# Patient Record
Sex: MALE | Race: WHITE | Employment: FULL TIME | URBAN - NONMETROPOLITAN AREA
[De-identification: names, ages, dates, MRNs, and addresses within clinical notes are randomized per-mention and may not be internally consistent; named-entity substitution may affect disease eponyms.]

---

## 2017-02-17 ENCOUNTER — PATIENT MESSAGE (OUTPATIENT)
Dept: FAMILY MEDICINE CLINIC | Facility: CLINIC | Age: 52
End: 2017-02-17

## 2017-02-17 RX ORDER — PROMETHAZINE HYDROCHLORIDE AND CODEINE PHOSPHATE 6.25; 1 MG/5ML; MG/5ML
5 SYRUP ORAL EVERY 4 HOURS PRN
Qty: 300 ML | Refills: 0 | Status: SHIPPED
Start: 2017-02-17 | End: 2017-07-12 | Stop reason: ALTCHOICE

## 2017-02-17 RX ORDER — PREDNISONE 20 MG/1
TABLET ORAL
Qty: 15 TABLET | Refills: 0 | Status: SHIPPED | OUTPATIENT
Start: 2017-02-17 | End: 2017-03-24

## 2017-02-17 NOTE — TELEPHONE ENCOUNTER
From: Venkat Scott  To: Beatriz Jackson MD  Sent: 2/17/2017 10:41 AM CST  Subject: Prescription Question    I went to urgent care back on 2/4/17 and was diagnosed with Influenza A.  It has been two weeks and the one thing that I have not been able to

## 2017-03-27 RX ORDER — PREDNISONE 20 MG/1
TABLET ORAL
Qty: 15 TABLET | Refills: 0 | Status: SHIPPED | OUTPATIENT
Start: 2017-03-27 | End: 2017-04-12

## 2017-03-27 RX ORDER — INDOMETHACIN 75 MG/1
CAPSULE, EXTENDED RELEASE ORAL
Qty: 60 CAPSULE | Refills: 0 | Status: SHIPPED | OUTPATIENT
Start: 2017-03-27 | End: 2017-07-17

## 2017-04-12 ENCOUNTER — PATIENT MESSAGE (OUTPATIENT)
Dept: FAMILY MEDICINE CLINIC | Facility: CLINIC | Age: 52
End: 2017-04-12

## 2017-04-12 RX ORDER — PREDNISONE 20 MG/1
TABLET ORAL
Qty: 15 TABLET | Refills: 3 | Status: SHIPPED | OUTPATIENT
Start: 2017-04-12 | End: 2017-07-11

## 2017-04-12 NOTE — TELEPHONE ENCOUNTER
From: Chayito Aggarwal  To: Augustine Estrada MD  Sent: 4/12/2017 10:25 AM CDT  Subject: Prescription Question    I was wondering if Dr. Brittany Pineda could put in another refill for prednisone for me?  I got 15 pills back on the 27th of March and since then I h

## 2017-07-11 RX ORDER — PREDNISONE 20 MG/1
TABLET ORAL
Qty: 15 TABLET | Refills: 3 | Status: SHIPPED
Start: 2017-07-11 | End: 2017-07-12 | Stop reason: ALTCHOICE

## 2017-07-11 RX ORDER — PREDNISONE 20 MG/1
TABLET ORAL
Qty: 15 TABLET | Refills: 0 | OUTPATIENT
Start: 2017-07-11

## 2017-07-11 NOTE — TELEPHONE ENCOUNTER
Patient called and has an appointment scheduled tomorrow with Dr. Kerrie Caldwell.      Last office visit; 9/21/16     Last refill: 4/12/17

## 2017-07-11 NOTE — TELEPHONE ENCOUNTER
From: Satya Garcia  Sent: 7/11/2017 9:04 AM CDT  Subject: Medication Renewal Request    Satya Garcia would like a refill of the following medications:  predniSONE 20 MG Oral Tab [Shun Grimm MD]    Preferred pharmacy: [address] [place] [address] [place]

## 2017-07-12 ENCOUNTER — OFFICE VISIT (OUTPATIENT)
Dept: FAMILY MEDICINE CLINIC | Facility: CLINIC | Age: 52
End: 2017-07-12

## 2017-07-12 VITALS
BODY MASS INDEX: 35.25 KG/M2 | WEIGHT: 238 LBS | SYSTOLIC BLOOD PRESSURE: 138 MMHG | HEART RATE: 95 BPM | DIASTOLIC BLOOD PRESSURE: 80 MMHG | TEMPERATURE: 97 F | OXYGEN SATURATION: 97 % | HEIGHT: 69 IN

## 2017-07-12 DIAGNOSIS — Z12.11 COLON CANCER SCREENING: ICD-10-CM

## 2017-07-12 DIAGNOSIS — M1A.09X0 CHRONIC GOUT OF MULTIPLE SITES, UNSPECIFIED CAUSE: Primary | ICD-10-CM

## 2017-07-12 DIAGNOSIS — Z23 NEED FOR DIPHTHERIA-TETANUS-PERTUSSIS (TDAP) VACCINE: ICD-10-CM

## 2017-07-12 PROCEDURE — 99213 OFFICE O/P EST LOW 20 MIN: CPT | Performed by: FAMILY MEDICINE

## 2017-07-12 PROCEDURE — 90715 TDAP VACCINE 7 YRS/> IM: CPT | Performed by: FAMILY MEDICINE

## 2017-07-12 PROCEDURE — 90471 IMMUNIZATION ADMIN: CPT | Performed by: FAMILY MEDICINE

## 2017-07-15 PROCEDURE — 82272 OCCULT BLD FECES 1-3 TESTS: CPT

## 2017-07-15 NOTE — PROGRESS NOTES
Dc Hough is a 50year old male.   Patient presents with:  Medication Follow-Up: . refills inrm 5      HPI:   Here for eval  Has had some gout issues  Related to diet as well    He states he has been better on the prednisone burst  About 4 per ye 10.3 mg/dL 9.1   GFR Latest Ref Range: >=60  80   ALKALINE PHOSPHATASE Latest Ref Range: 45 - 117 U/L 69   AST (SGOT) Latest Ref Range: 15 - 41 U/L 18   ALT (SGPT) Latest Ref Range: 17 - 63 U/L 48   Total Bilirubin Latest Ref Range: 0.1 - 2.0 mg/dL 0.5   T TOXOIDS AND ACELLULAR PERTUSIS VACCINE (TDAP), >7 YEARS, IM USE (Completed)            Return in about 1 year (around 7/12/2018), or if symptoms worsen or fail to improve, for blood pressure check, discussion of dosage adjustment, medication review.       Scooter Diaz

## 2017-07-15 NOTE — PATIENT INSTRUCTIONS
Gout Diet  Gout is a painful condition caused by an excess of uric acid, a waste product made by the body. Uric acid forms crystals that collect in the joints. The immune response to these crystals brings on symptoms of joint pain and swelling.  This is c · Dairy products that are low-fat or fat-free, such as cheese and yogurt  · Complex carbohydrate foods, including whole grains, brown rice, oats, and beans  · Coffee, in moderation  · Water, approximately 64 ounces per day  Follow-up care  Follow up with lucio

## 2017-07-17 RX ORDER — INDOMETHACIN 75 MG/1
CAPSULE, EXTENDED RELEASE ORAL
Qty: 60 CAPSULE | Refills: 0 | Status: SHIPPED | OUTPATIENT
Start: 2017-07-17 | End: 2017-10-06

## 2017-07-20 ENCOUNTER — APPOINTMENT (OUTPATIENT)
Dept: LAB | Facility: HOSPITAL | Age: 52
End: 2017-07-20
Attending: FAMILY MEDICINE
Payer: COMMERCIAL

## 2017-07-20 DIAGNOSIS — Z12.11 COLON CANCER SCREENING: ICD-10-CM

## 2017-07-31 LAB
AMB EXT CHOL/HDL RATIO: 3.9
AMB EXT CHOLESTEROL, TOTAL: 228 MG/DL
AMB EXT CREATININE: 1 MG/DL
AMB EXT GLUCOSE: 81 MG/DL
AMB EXT HDL CHOLESTEROL: 58 MG/DL
AMB EXT LDL CHOLESTEROL, DIRECT: 127 MG/DL
AMB EXT TRIGLYCERIDES: 214 MG/DL

## 2017-10-06 RX ORDER — PREDNISONE 20 MG/1
TABLET ORAL
Qty: 30 TABLET | Refills: 0 | Status: SHIPPED
Start: 2017-10-06 | End: 2017-11-17

## 2017-10-06 RX ORDER — INDOMETHACIN 75 MG/1
CAPSULE, EXTENDED RELEASE ORAL
Qty: 60 CAPSULE | Refills: 0 | Status: SHIPPED
Start: 2017-10-06 | End: 2017-12-29

## 2017-10-06 NOTE — TELEPHONE ENCOUNTER
From: Luzma Ordonez  Sent: 10/6/2017 1:53 PM CDT  Subject: Medication Renewal Request    Luzma Ordonez would like a refill of the following medications:     PREDNISONE 20 MG Oral Tab [Shun Grimm MD]     INDOMETHACIN ER 75 MG Oral Cap CR [

## 2017-10-07 RX ORDER — PREDNISONE 20 MG/1
TABLET ORAL
Qty: 15 TABLET | Refills: 0 | OUTPATIENT
Start: 2017-10-07

## 2017-10-07 RX ORDER — INDOMETHACIN 75 MG/1
CAPSULE, EXTENDED RELEASE ORAL
Qty: 60 CAPSULE | Refills: 0 | OUTPATIENT
Start: 2017-10-07

## 2017-11-17 RX ORDER — PREDNISONE 20 MG/1
TABLET ORAL
Qty: 30 TABLET | Refills: 0 | Status: SHIPPED | OUTPATIENT
Start: 2017-11-17 | End: 2017-12-29

## 2017-11-17 NOTE — TELEPHONE ENCOUNTER
Fax request received from [facility] requesting refill on Prednisone tapering dose. Last office visit 7-12-17 for gout.   Last refill 10-6-17

## 2017-12-29 RX ORDER — PREDNISONE 20 MG/1
TABLET ORAL
Qty: 30 TABLET | Refills: 6 | Status: SHIPPED | OUTPATIENT
Start: 2017-12-29 | End: 2018-11-05

## 2017-12-30 RX ORDER — INDOMETHACIN 75 MG/1
CAPSULE, EXTENDED RELEASE ORAL
Qty: 60 CAPSULE | Refills: 0 | Status: SHIPPED | OUTPATIENT
Start: 2017-12-30 | End: 2018-04-28

## 2018-04-28 RX ORDER — INDOMETHACIN 75 MG/1
CAPSULE, EXTENDED RELEASE ORAL
Qty: 180 CAPSULE | Refills: 0 | Status: SHIPPED | OUTPATIENT
Start: 2018-04-28 | End: 2018-10-29

## 2018-10-29 ENCOUNTER — TELEPHONE (OUTPATIENT)
Dept: FAMILY MEDICINE CLINIC | Facility: CLINIC | Age: 53
End: 2018-10-29

## 2018-10-29 RX ORDER — PREDNISONE 20 MG/1
TABLET ORAL
Qty: 30 TABLET | Refills: 0 | OUTPATIENT
Start: 2018-10-29

## 2018-10-29 RX ORDER — INDOMETHACIN 75 MG/1
CAPSULE, EXTENDED RELEASE ORAL
Qty: 28 CAPSULE | Refills: 0 | Status: SHIPPED | OUTPATIENT
Start: 2018-10-29 | End: 2019-02-19

## 2018-10-29 NOTE — TELEPHONE ENCOUNTER
Advised pt that refill was denied as he has not been seen sine 7/2017.  Pt is requesting a 2 week supply of indomethacin be sent to pharmacy and he will schedule appointment    Future Appointments   Date Time Provider Leah Javier   11/5/2018  2:00 PM

## 2018-11-05 ENCOUNTER — OFFICE VISIT (OUTPATIENT)
Dept: FAMILY MEDICINE CLINIC | Facility: CLINIC | Age: 53
End: 2018-11-05
Payer: COMMERCIAL

## 2018-11-05 VITALS
BODY MASS INDEX: 36.51 KG/M2 | WEIGHT: 246.5 LBS | TEMPERATURE: 98 F | HEART RATE: 86 BPM | SYSTOLIC BLOOD PRESSURE: 140 MMHG | HEIGHT: 69 IN | OXYGEN SATURATION: 99 % | DIASTOLIC BLOOD PRESSURE: 86 MMHG

## 2018-11-05 DIAGNOSIS — M1A.09X0 CHRONIC GOUT OF MULTIPLE SITES, UNSPECIFIED CAUSE: ICD-10-CM

## 2018-11-05 DIAGNOSIS — Z12.11 COLON CANCER SCREENING: ICD-10-CM

## 2018-11-05 DIAGNOSIS — Z99.89 OSA ON CPAP: ICD-10-CM

## 2018-11-05 DIAGNOSIS — M25.50 ARTHRALGIA, UNSPECIFIED JOINT: ICD-10-CM

## 2018-11-05 DIAGNOSIS — E66.9 OBESITY (BMI 35.0-39.9 WITHOUT COMORBIDITY): ICD-10-CM

## 2018-11-05 DIAGNOSIS — Z00.00 ROUTINE GENERAL MEDICAL EXAMINATION AT A HEALTH CARE FACILITY: Primary | ICD-10-CM

## 2018-11-05 DIAGNOSIS — G47.33 OSA ON CPAP: ICD-10-CM

## 2018-11-05 PROBLEM — E66.811 OBESITY (BMI 30.0-34.9): Status: ACTIVE | Noted: 2018-11-05

## 2018-11-05 PROCEDURE — 99396 PREV VISIT EST AGE 40-64: CPT | Performed by: FAMILY MEDICINE

## 2018-11-05 RX ORDER — PREDNISONE 20 MG/1
TABLET ORAL
Qty: 30 TABLET | Refills: 11 | Status: SHIPPED | OUTPATIENT
Start: 2018-11-05 | End: 2019-11-26

## 2018-11-05 NOTE — PROGRESS NOTES
Radha Davalos is a 48year old male.     CC:  Patient presents with:  Physical    Subjective   HPI:  Here for evaluation    Needs prednisone intermittently  2  He is good when he treats   The indomethacin helps    Running hi blood pressure      All anemia  ENDOCRINE: denies thyroid history  ALL/ASTHMA: denies hx of allergy or asthma     Objective   EXAM:   Blood pressure 140/86, pulse 86, temperature 97.5 °F (36.4 °C), temperature source Temporal, height 69\", weight 246 lb 8 oz, SpO2 99 %.   Body mas Gastro Referral - Karmen Meth ([facility])            Meds & Refills for this Visit:  Requested Prescriptions     Signed Prescriptions Disp Refills   • predniSONE 20 MG Oral Tab 30 tablet 11     Sig: TAKE 3 TABLETS BY MOUTH DAILY FOR 5 DAYS, 2 TABLETS D

## 2018-11-07 ENCOUNTER — LABORATORY ENCOUNTER (OUTPATIENT)
Dept: LAB | Age: 53
End: 2018-11-07
Attending: FAMILY MEDICINE
Payer: COMMERCIAL

## 2018-11-07 DIAGNOSIS — E66.9 OBESITY (BMI 35.0-39.9 WITHOUT COMORBIDITY): ICD-10-CM

## 2018-11-07 DIAGNOSIS — Z12.5 SCREENING FOR PROSTATE CANCER: Primary | ICD-10-CM

## 2018-11-07 DIAGNOSIS — M1A.09X0 CHRONIC GOUT OF MULTIPLE SITES, UNSPECIFIED CAUSE: ICD-10-CM

## 2018-11-07 DIAGNOSIS — Z00.00 HEALTH MAINTENANCE EXAMINATION: ICD-10-CM

## 2018-11-07 PROCEDURE — 84153 ASSAY OF PSA TOTAL: CPT | Performed by: FAMILY MEDICINE

## 2018-11-07 PROCEDURE — 80061 LIPID PANEL: CPT | Performed by: FAMILY MEDICINE

## 2018-11-07 PROCEDURE — 80053 COMPREHEN METABOLIC PANEL: CPT | Performed by: FAMILY MEDICINE

## 2018-11-07 PROCEDURE — 83036 HEMOGLOBIN GLYCOSYLATED A1C: CPT | Performed by: FAMILY MEDICINE

## 2018-11-07 PROCEDURE — 36415 COLL VENOUS BLD VENIPUNCTURE: CPT | Performed by: FAMILY MEDICINE

## 2018-11-07 PROCEDURE — 84550 ASSAY OF BLOOD/URIC ACID: CPT | Performed by: FAMILY MEDICINE

## 2019-02-19 RX ORDER — INDOMETHACIN 75 MG/1
CAPSULE, EXTENDED RELEASE ORAL
Qty: 28 CAPSULE | Refills: 5 | Status: SHIPPED | OUTPATIENT
Start: 2019-02-19 | End: 2019-09-26

## 2019-03-28 ENCOUNTER — TELEPHONE (OUTPATIENT)
Dept: FAMILY MEDICINE CLINIC | Facility: CLINIC | Age: 54
End: 2019-03-28

## 2019-03-28 ENCOUNTER — OFFICE VISIT (OUTPATIENT)
Dept: FAMILY MEDICINE CLINIC | Facility: CLINIC | Age: 54
End: 2019-03-28
Payer: COMMERCIAL

## 2019-03-28 VITALS
HEART RATE: 82 BPM | HEIGHT: 70.25 IN | RESPIRATION RATE: 12 BRPM | TEMPERATURE: 98 F | DIASTOLIC BLOOD PRESSURE: 88 MMHG | BODY MASS INDEX: 36.36 KG/M2 | WEIGHT: 254 LBS | SYSTOLIC BLOOD PRESSURE: 148 MMHG

## 2019-03-28 DIAGNOSIS — R05.9 COUGH: ICD-10-CM

## 2019-03-28 DIAGNOSIS — J01.90 ACUTE RHINOSINUSITIS: Primary | ICD-10-CM

## 2019-03-28 PROCEDURE — 99213 OFFICE O/P EST LOW 20 MIN: CPT | Performed by: FAMILY MEDICINE

## 2019-03-28 RX ORDER — AMOXICILLIN AND CLAVULANATE POTASSIUM 875; 125 MG/1; MG/1
1 TABLET, FILM COATED ORAL 2 TIMES DAILY
Qty: 20 TABLET | Refills: 0 | Status: SHIPPED | OUTPATIENT
Start: 2019-03-28 | End: 2019-04-07

## 2019-03-28 NOTE — PATIENT INSTRUCTIONS
Amoxicillin; Clavulanic Acid tablets  Brand Name: Augmentin  What is this medicine? AMOXICILLIN; CLAVULANIC ACID (a mox i MEENA in; OSVALDO palmer ic AS id) is a penicillin antibiotic. It is used to treat certain kinds of bacterial infections.  It will not w If you miss a dose, take it as soon as you can. If it is almost time for your next dose, take only that dose. Do not take double or extra doses. Where should I keep my medicine? Keep out of the reach of children.   Store at room temperature below 25 degre

## 2019-03-28 NOTE — TELEPHONE ENCOUNTER
Future Appointments   Date Time Provider Leah Concepcion   3/28/2019 11:00 AM Alida Grimm MD [facility] [place]

## 2019-03-28 NOTE — PROGRESS NOTES
Patient presents with:  Cough: congestion . inrm 6      HPI:   Luz Maria Sorto is a 48year old male who presents for upper respiratory symptoms for  3  days.  Patient reports sore throat, congestion, dry cough, sinus pain, OTC cold meds have not been clear  HEENT: atraumatic, normocephalic,ears and throat are clear, mild postnasal drainage  NECK: supple,no adenopathy,no bruits  LUNGS: clear to auscultation  CARDIO: RRR without murmur  LYMPH: min ant cerv adenopathy    ASSESSMENT AND PLAN:     Acute rhi

## 2019-09-26 RX ORDER — INDOMETHACIN 75 MG/1
CAPSULE, EXTENDED RELEASE ORAL
Qty: 28 CAPSULE | Refills: 5 | Status: SHIPPED | OUTPATIENT
Start: 2019-09-26 | End: 2020-04-06

## 2019-10-01 ENCOUNTER — OFFICE VISIT (OUTPATIENT)
Dept: FAMILY MEDICINE CLINIC | Facility: CLINIC | Age: 54
End: 2019-10-01
Payer: COMMERCIAL

## 2019-10-01 VITALS
RESPIRATION RATE: 12 BRPM | BODY MASS INDEX: 36.4 KG/M2 | HEART RATE: 88 BPM | WEIGHT: 254.25 LBS | TEMPERATURE: 97 F | DIASTOLIC BLOOD PRESSURE: 108 MMHG | HEIGHT: 70.25 IN | SYSTOLIC BLOOD PRESSURE: 176 MMHG

## 2019-10-01 DIAGNOSIS — I10 ESSENTIAL HYPERTENSION, BENIGN: Primary | ICD-10-CM

## 2019-10-01 DIAGNOSIS — Z23 NEED FOR VACCINATION: ICD-10-CM

## 2019-10-01 PROCEDURE — 90471 IMMUNIZATION ADMIN: CPT | Performed by: FAMILY MEDICINE

## 2019-10-01 PROCEDURE — 90686 IIV4 VACC NO PRSV 0.5 ML IM: CPT | Performed by: FAMILY MEDICINE

## 2019-10-01 PROCEDURE — 99213 OFFICE O/P EST LOW 20 MIN: CPT | Performed by: FAMILY MEDICINE

## 2019-10-01 RX ORDER — LISINOPRIL 10 MG/1
10 TABLET ORAL DAILY
Qty: 90 TABLET | Refills: 3 | Status: SHIPPED | OUTPATIENT
Start: 2019-10-01 | End: 2020-09-25

## 2019-10-01 NOTE — PROGRESS NOTES
Lisa Laboy is a 48year old male.   Patient presents with:  HTN: .inrm 5      Chief Complaint Reviewed and Verified  Nursing Notes Reviewed and   Verified  Tobacco Reviewed  Allergies Reviewed  Medications Reviewed    Problem List Reviewed  Johnnie Riddles Date(s) Administered    FLULAVAL 6 months & older 0.5 ml Prefilled syringe ([id number])                          10/01/2019      TDAP                  07/12/2017    Pended                  Date(s) Pended    Afluria, 9 Years & >, IM                          11/ understanding of these issues and agrees to the plan.

## 2019-10-22 ENCOUNTER — TELEPHONE (OUTPATIENT)
Dept: FAMILY MEDICINE CLINIC | Facility: CLINIC | Age: 54
End: 2019-10-22

## 2019-10-22 ENCOUNTER — MED REC SCAN ONLY (OUTPATIENT)
Dept: FAMILY MEDICINE CLINIC | Facility: CLINIC | Age: 54
End: 2019-10-22

## 2019-10-22 NOTE — TELEPHONE ENCOUNTER
Pt states that he would like to do the cologuard.  Paper filled out and information sent to [facility]

## 2019-11-26 DIAGNOSIS — M1A.09X0 CHRONIC GOUT OF MULTIPLE SITES, UNSPECIFIED CAUSE: ICD-10-CM

## 2019-11-26 RX ORDER — PREDNISONE 20 MG/1
TABLET ORAL
Qty: 30 TABLET | Refills: 0 | Status: SHIPPED | OUTPATIENT
Start: 2019-11-26 | End: 2020-01-10

## 2019-11-26 NOTE — TELEPHONE ENCOUNTER
Last OV: 10/1/2019  Last filled: 11/5/2018 #30 w/ 11RF    Uses for gout flare, but had indomethacin filled 9/26/2019 #28 w/ 5RF? ?

## 2019-11-27 ENCOUNTER — TELEPHONE (OUTPATIENT)
Dept: FAMILY MEDICINE CLINIC | Facility: CLINIC | Age: 54
End: 2019-11-27

## 2020-01-10 DIAGNOSIS — M1A.09X0 CHRONIC GOUT OF MULTIPLE SITES, UNSPECIFIED CAUSE: ICD-10-CM

## 2020-01-10 RX ORDER — PREDNISONE 20 MG/1
TABLET ORAL
Qty: 30 TABLET | Refills: 0 | Status: SHIPPED | OUTPATIENT
Start: 2020-01-10 | End: 2020-02-08

## 2020-01-10 NOTE — TELEPHONE ENCOUNTER
LOV: 10/01/19    LAST LAB: 11/7/18    LAST RX: 11/26/19, 30 tabs, 0 refills    Next OV: No future appointments.     PROTOCOL: n/a

## 2020-02-08 DIAGNOSIS — M1A.09X0 CHRONIC GOUT OF MULTIPLE SITES, UNSPECIFIED CAUSE: ICD-10-CM

## 2020-02-10 RX ORDER — PREDNISONE 20 MG/1
TABLET ORAL
Qty: 30 TABLET | Refills: 0 | Status: SHIPPED | OUTPATIENT
Start: 2020-02-10 | End: 2020-03-12

## 2020-03-12 DIAGNOSIS — M1A.09X0 CHRONIC GOUT OF MULTIPLE SITES, UNSPECIFIED CAUSE: ICD-10-CM

## 2020-03-12 RX ORDER — PREDNISONE 20 MG/1
TABLET ORAL
Qty: 30 TABLET | Refills: 0 | Status: SHIPPED | OUTPATIENT
Start: 2020-03-12 | End: 2020-04-06

## 2020-03-12 NOTE — TELEPHONE ENCOUNTER
LOV  10/01/2019    LAST LAB  11/7/2018    LAST RX  2/10/2020 (30 tabs, 0 refill)    Next OV  No future appointments.     PROTOCOL  none

## 2020-04-06 DIAGNOSIS — M1A.09X0 CHRONIC GOUT OF MULTIPLE SITES, UNSPECIFIED CAUSE: ICD-10-CM

## 2020-04-06 RX ORDER — PREDNISONE 20 MG/1
TABLET ORAL
Qty: 30 TABLET | Refills: 0 | Status: SHIPPED | OUTPATIENT
Start: 2020-04-06 | End: 2020-05-05

## 2020-04-06 RX ORDER — INDOMETHACIN 75 MG/1
CAPSULE, EXTENDED RELEASE ORAL
Qty: 28 CAPSULE | Refills: 5 | Status: SHIPPED | OUTPATIENT
Start: 2020-04-06 | End: 2020-09-21

## 2020-04-06 NOTE — TELEPHONE ENCOUNTER
LOV  10/01/2019    LAST LAB  11/07/2018    LAST RX  03/12/2020  (30 tabs, 0 refill)    Next OV  No future appointments.     PROTOCOL  none

## 2020-04-06 NOTE — TELEPHONE ENCOUNTER
LOV  10/01/2019    LAST LAB  11/7/2018    LAST RX  Indomethacin  28   caps 5   refill 9/26/2019         Next OV  No future appointments.     PROTOCOL  none

## 2020-05-05 DIAGNOSIS — M1A.09X0 CHRONIC GOUT OF MULTIPLE SITES, UNSPECIFIED CAUSE: ICD-10-CM

## 2020-05-05 RX ORDER — PREDNISONE 20 MG/1
TABLET ORAL
Qty: 30 TABLET | Refills: 0 | Status: SHIPPED | OUTPATIENT
Start: 2020-05-05 | End: 2020-06-03

## 2020-05-05 NOTE — TELEPHONE ENCOUNTER
Last refill: 04/06/20  Qty: 30  W/ 0 refills  Last ov: 10/01/19    Requested Prescriptions     Pending Prescriptions Disp Refills   • predniSONE 20 MG Oral Tab [Pharmacy Med Name: PREDNISONE 20MG TABLETS] 30 tablet 0     Sig: TAKE 3 TABLETS BY MOUTH EVERY

## 2020-06-02 DIAGNOSIS — M1A.09X0 CHRONIC GOUT OF MULTIPLE SITES, UNSPECIFIED CAUSE: ICD-10-CM

## 2020-06-03 RX ORDER — PREDNISONE 20 MG/1
TABLET ORAL
Qty: 30 TABLET | Refills: 0 | Status: SHIPPED | OUTPATIENT
Start: 2020-06-03 | End: 2020-07-01

## 2020-06-03 NOTE — TELEPHONE ENCOUNTER
Dr Grimm please advise the SIG on this RX. (typically an acute direction)  Multiple monthly refills    Pt has not been seen since 10-1-19    No future appointments. Please advise?

## 2020-07-01 DIAGNOSIS — M1A.09X0 CHRONIC GOUT OF MULTIPLE SITES, UNSPECIFIED CAUSE: ICD-10-CM

## 2020-07-01 RX ORDER — PREDNISONE 20 MG/1
TABLET ORAL
Qty: 30 TABLET | Refills: 0 | Status: SHIPPED | OUTPATIENT
Start: 2020-07-01 | End: 2020-07-29

## 2020-07-01 NOTE — TELEPHONE ENCOUNTER
Last office visit: 10/01/19  Last refill: 6/3/2020  Last labs: 11/7/2018  No future appointments.    Name from pharmacy: PREDNISONE 20MG TABLETS         Will file in chart as: PREDNISONE 20 MG Oral Tab         Sig: TAKE 3 TABLETS BY MOUTH DAILY FOR 5 DAYS,

## 2020-07-28 ENCOUNTER — LAB ENCOUNTER (OUTPATIENT)
Dept: LAB | Age: 55
End: 2020-07-28
Attending: FAMILY MEDICINE
Payer: COMMERCIAL

## 2020-07-28 ENCOUNTER — OFFICE VISIT (OUTPATIENT)
Dept: FAMILY MEDICINE CLINIC | Facility: CLINIC | Age: 55
End: 2020-07-28
Payer: COMMERCIAL

## 2020-07-28 VITALS
WEIGHT: 253 LBS | HEART RATE: 83 BPM | OXYGEN SATURATION: 99 % | DIASTOLIC BLOOD PRESSURE: 88 MMHG | SYSTOLIC BLOOD PRESSURE: 138 MMHG | BODY MASS INDEX: 36 KG/M2 | TEMPERATURE: 97 F

## 2020-07-28 DIAGNOSIS — R23.8 EASY BRUISABILITY: ICD-10-CM

## 2020-07-28 DIAGNOSIS — R23.8 EASY BRUISABILITY: Primary | ICD-10-CM

## 2020-07-28 DIAGNOSIS — M1A.09X0 CHRONIC GOUT OF MULTIPLE SITES, UNSPECIFIED CAUSE: ICD-10-CM

## 2020-07-28 DIAGNOSIS — M25.50 POLYARTHRALGIA: ICD-10-CM

## 2020-07-28 LAB
BASOPHILS # BLD AUTO: 0.03 X10(3) UL (ref 0–0.2)
BASOPHILS NFR BLD AUTO: 0.4 %
CRP SERPL-MCNC: 0.44 MG/DL (ref ?–0.3)
DEPRECATED RDW RBC AUTO: 44.5 FL (ref 35.1–46.3)
EOSINOPHIL # BLD AUTO: 0.12 X10(3) UL (ref 0–0.7)
EOSINOPHIL NFR BLD AUTO: 1.5 %
ERYTHROCYTE [DISTWIDTH] IN BLOOD BY AUTOMATED COUNT: 12.7 % (ref 11–15)
HCT VFR BLD AUTO: 40.2 % (ref 39–53)
HGB BLD-MCNC: 13.5 G/DL (ref 13–17.5)
IMM GRANULOCYTES # BLD AUTO: 0.04 X10(3) UL (ref 0–1)
IMM GRANULOCYTES NFR BLD: 0.5 %
INR BLD: 0.86 (ref 0.89–1.11)
LYMPHOCYTES # BLD AUTO: 2.63 X10(3) UL (ref 1–4)
LYMPHOCYTES NFR BLD AUTO: 33.4 %
MCH RBC QN AUTO: 32.1 PG (ref 26–34)
MCHC RBC AUTO-ENTMCNC: 33.6 G/DL (ref 31–37)
MCV RBC AUTO: 95.7 FL (ref 80–100)
MONOCYTES # BLD AUTO: 0.74 X10(3) UL (ref 0.1–1)
MONOCYTES NFR BLD AUTO: 9.4 %
NEUTROPHILS # BLD AUTO: 4.32 X10 (3) UL (ref 1.5–7.7)
NEUTROPHILS # BLD AUTO: 4.32 X10(3) UL (ref 1.5–7.7)
NEUTROPHILS NFR BLD AUTO: 54.8 %
PLATELET # BLD AUTO: 242 10(3)UL (ref 150–450)
PSA SERPL DL<=0.01 NG/ML-MCNC: 12 SECONDS (ref 12.4–14.6)
RBC # BLD AUTO: 4.2 X10(6)UL (ref 4.3–5.7)
WBC # BLD AUTO: 7.9 X10(3) UL (ref 4–11)

## 2020-07-28 PROCEDURE — 85730 THROMBOPLASTIN TIME PARTIAL: CPT | Performed by: FAMILY MEDICINE

## 2020-07-28 PROCEDURE — 3075F SYST BP GE 130 - 139MM HG: CPT | Performed by: FAMILY MEDICINE

## 2020-07-28 PROCEDURE — 99213 OFFICE O/P EST LOW 20 MIN: CPT | Performed by: FAMILY MEDICINE

## 2020-07-28 PROCEDURE — 85025 COMPLETE CBC W/AUTO DIFF WBC: CPT | Performed by: FAMILY MEDICINE

## 2020-07-28 PROCEDURE — 86140 C-REACTIVE PROTEIN: CPT | Performed by: FAMILY MEDICINE

## 2020-07-28 PROCEDURE — 85610 PROTHROMBIN TIME: CPT | Performed by: FAMILY MEDICINE

## 2020-07-28 PROCEDURE — 36415 COLL VENOUS BLD VENIPUNCTURE: CPT | Performed by: FAMILY MEDICINE

## 2020-07-28 PROCEDURE — 3079F DIAST BP 80-89 MM HG: CPT | Performed by: FAMILY MEDICINE

## 2020-07-29 DIAGNOSIS — M1A.09X0 CHRONIC GOUT OF MULTIPLE SITES, UNSPECIFIED CAUSE: ICD-10-CM

## 2020-07-29 LAB — APTT PPP: 29.8 SECONDS (ref 25.4–36.1)

## 2020-07-29 RX ORDER — PREDNISONE 20 MG/1
TABLET ORAL
Qty: 30 TABLET | Refills: 0 | Status: SHIPPED | OUTPATIENT
Start: 2020-07-29 | End: 2020-08-24

## 2020-07-29 NOTE — PROGRESS NOTES
Willy Portillo is a 50year old male. Patient presents with:  Derm Problem: contusion type skin issues, possible interation with meds?       Chief Complaint Reviewed and Verified  Nursing Notes Reviewed and   Verified  Tobacco Reviewed  Allergies Rev Snuff    Alcohol use:  Yes      Alcohol/week: 6.0 standard drinks      Types: 6 Cans of beer per week      Frequency: 4 or more times a week      Drinks per session: 5 or 6      Binge frequency: Daily or almost daily      Comment: 6pack daily    Drug use: N Jocelyn Madrigal M.D., FAAFP      The patient indicates understanding of these issues and agrees to the plan.

## 2020-08-24 DIAGNOSIS — M1A.09X0 CHRONIC GOUT OF MULTIPLE SITES, UNSPECIFIED CAUSE: ICD-10-CM

## 2020-08-24 RX ORDER — PREDNISONE 20 MG/1
TABLET ORAL
Qty: 30 TABLET | Refills: 0 | Status: SHIPPED | OUTPATIENT
Start: 2020-08-24 | End: 2020-09-21

## 2020-08-24 NOTE — TELEPHONE ENCOUNTER
LOV 7/28/20    LAST LAB 7/28/20, last routine labs 11/7/18    LAST RX 7/29/20    Next OV None scheduled    PROTOCOL

## 2020-09-21 DIAGNOSIS — M1A.09X0 CHRONIC GOUT OF MULTIPLE SITES, UNSPECIFIED CAUSE: ICD-10-CM

## 2020-09-21 RX ORDER — INDOMETHACIN 75 MG/1
CAPSULE, EXTENDED RELEASE ORAL
Qty: 28 CAPSULE | Refills: 5 | Status: SHIPPED | OUTPATIENT
Start: 2020-09-21 | End: 2021-05-15

## 2020-09-21 RX ORDER — PREDNISONE 20 MG/1
TABLET ORAL
Qty: 30 TABLET | Refills: 0 | Status: SHIPPED | OUTPATIENT
Start: 2020-09-21 | End: 2020-10-20

## 2020-09-21 NOTE — TELEPHONE ENCOUNTER
LOV 7/28/20    LAST LAB 7/28/20    LAST RX prednisone 8/24/20 #30  Indomethacin     Next OV    PROTOCOL  He has not scheduled the appointment with rheumatologist, states he will.

## 2020-09-26 ENCOUNTER — TELEPHONE (OUTPATIENT)
Dept: FAMILY MEDICINE CLINIC | Facility: CLINIC | Age: 55
End: 2020-09-26

## 2020-09-26 DIAGNOSIS — Z20.822 EXPOSURE TO COVID-19 VIRUS: Primary | ICD-10-CM

## 2020-09-28 NOTE — TELEPHONE ENCOUNTER
He should test  And quarantine at least until he knows results  And if symptom start  Then he needs to definitely quarantine    I can order a test    Ordered    Let patient know how to schedule

## 2020-09-28 NOTE — TELEPHONE ENCOUNTER
Patient advised, he had seen the order on My Chart, has called and is scheduled for tomorrow am in [place].

## 2020-09-28 NOTE — TELEPHONE ENCOUNTER
They work at the same place but are . Brother, Margot Jerez found out on Saturday that he is covid +. They do not wear masks in the office. Brother is mildly ill. What does he need to do?

## 2020-09-29 ENCOUNTER — APPOINTMENT (OUTPATIENT)
Dept: LAB | Age: 55
End: 2020-09-29
Attending: FAMILY MEDICINE
Payer: COMMERCIAL

## 2020-09-29 DIAGNOSIS — Z20.822 EXPOSURE TO COVID-19 VIRUS: ICD-10-CM

## 2020-10-19 ENCOUNTER — PATIENT MESSAGE (OUTPATIENT)
Dept: FAMILY MEDICINE CLINIC | Facility: CLINIC | Age: 55
End: 2020-10-19

## 2020-10-19 DIAGNOSIS — M1A.09X0 CHRONIC GOUT OF MULTIPLE SITES, UNSPECIFIED CAUSE: ICD-10-CM

## 2020-10-19 NOTE — TELEPHONE ENCOUNTER
From: Sharda Moody  To: Latoya Mercedes MD  Sent: 10/19/2020 3:16 PM CDT  Subject: Other    Just letting you know that today was the first day that they would book appointments for Dr. Lana Casas. The soonest I could get in is November 9th.  I will pro

## 2020-10-20 RX ORDER — PREDNISONE 20 MG/1
TABLET ORAL
Qty: 30 TABLET | Refills: 0 | Status: SHIPPED | OUTPATIENT
Start: 2020-10-20 | End: 2020-11-20

## 2020-11-20 DIAGNOSIS — M1A.09X0 CHRONIC GOUT OF MULTIPLE SITES, UNSPECIFIED CAUSE: ICD-10-CM

## 2020-11-20 NOTE — TELEPHONE ENCOUNTER
LOV 07/28/2020    LAST LAB 07/28/2020    LAST RX  Prednisone #30 R0 10/20//2020    Next OV No future appointments.       PROTOCOL

## 2020-11-23 RX ORDER — PREDNISONE 20 MG/1
TABLET ORAL
Qty: 30 TABLET | Refills: 0 | Status: SHIPPED | OUTPATIENT
Start: 2020-11-23 | End: 2021-11-12

## 2020-12-04 ENCOUNTER — TELEPHONE (OUTPATIENT)
Dept: FAMILY MEDICINE CLINIC | Facility: CLINIC | Age: 55
End: 2020-12-04

## 2020-12-05 ENCOUNTER — OFFICE VISIT (OUTPATIENT)
Dept: FAMILY MEDICINE CLINIC | Facility: CLINIC | Age: 55
End: 2020-12-05
Payer: COMMERCIAL

## 2020-12-05 VITALS — HEART RATE: 86 BPM | OXYGEN SATURATION: 98 % | TEMPERATURE: 98 F

## 2020-12-05 DIAGNOSIS — J06.9 UPPER RESPIRATORY TRACT INFECTION, UNSPECIFIED TYPE: Primary | ICD-10-CM

## 2020-12-05 PROBLEM — Z78.9 ADMITS TO ALCOHOL CONSUMPTION: Status: ACTIVE | Noted: 2020-11-25

## 2020-12-05 PROBLEM — M25.50 PAIN IN JOINT: Status: ACTIVE | Noted: 2020-11-11

## 2020-12-05 PROBLEM — M1A.00X0 IDIOPATHIC CHRONIC GOUT WITHOUT TOPHUS: Status: ACTIVE | Noted: 2020-11-11

## 2020-12-05 PROCEDURE — 99213 OFFICE O/P EST LOW 20 MIN: CPT | Performed by: NURSE PRACTITIONER

## 2020-12-05 RX ORDER — COLCHICINE 0.6 MG/1
0.6 TABLET ORAL DAILY
COMMUNITY
Start: 2020-11-25

## 2020-12-05 RX ORDER — ALLOPURINOL 300 MG/1
300 TABLET ORAL DAILY
COMMUNITY
Start: 2020-11-25 | End: 2021-11-12

## 2020-12-05 RX ORDER — LISINOPRIL 10 MG/1
1 TABLET ORAL DAILY
COMMUNITY
Start: 2020-10-21 | End: 2021-01-25

## 2020-12-05 RX ORDER — PREDNISONE 1 MG/1
5 TABLET ORAL 4 TIMES DAILY
COMMUNITY
Start: 2020-11-25

## 2020-12-05 NOTE — PROGRESS NOTES
HPI:   Rhinorrhea  This is a new problem. Episode onset: Thursday. The problem occurs constantly. The problem has been waxing and waning. Associated symptoms include congestion (slight) and coughing (from PND).  Pertinent negatives include no chest pain, ch Cardiovascular: Negative for chest pain and palpitations. Musculoskeletal: Negative for myalgias. EXAM:   Pulse 86   Temp 97.8 °F (36.6 °C) (Temporal)   SpO2 98%   Physical Exam    Constitutional: He appears well-developed and well-nourished.  No

## 2021-01-25 RX ORDER — LISINOPRIL 10 MG/1
TABLET ORAL
Qty: 90 TABLET | Refills: 0 | Status: SHIPPED | OUTPATIENT
Start: 2021-01-25 | End: 2021-04-21

## 2021-01-25 NOTE — TELEPHONE ENCOUNTER
LOV 12/05/2020    LAST LAB 11/30/2020 at [facility]  Lisinopril #90 R3 10/01/219    Next OV No future appointments.       PROTOCOL    Hypertension Medications Protocol [id number]/25/2021 08:57 AM   CMP or BMP in past 12 months Protocol Details

## 2021-04-02 ENCOUNTER — TELEPHONE (OUTPATIENT)
Dept: FAMILY MEDICINE CLINIC | Facility: CLINIC | Age: 56
End: 2021-04-02

## 2021-04-03 NOTE — TELEPHONE ENCOUNTER
Patient has been vacinated with the first dose of covid vaccine. He has now been exposed to his son yesterday. He was questioning if he could still carry the virus.  Patient advised yes and also told that he is not 100% covered and is still able to get the

## 2021-04-09 DIAGNOSIS — Z23 NEED FOR VACCINATION: ICD-10-CM

## 2021-04-21 RX ORDER — LISINOPRIL 10 MG/1
TABLET ORAL
Qty: 90 TABLET | Refills: 0 | Status: SHIPPED | OUTPATIENT
Start: 2021-04-21 | End: 2021-08-04

## 2021-04-21 NOTE — TELEPHONE ENCOUNTER
LOV 12/05/2020    LAST LAB 11/07/2018    LAST RX  Lisinopril #90 R0 01/25/2021    Next OV No future appointments.     PROTOCOL  Hypertension Medications Protocol [id number]/21/2021 10:30 AM   CMP or BMP in past 12 months Protocol Details    Last serum creatin

## 2021-05-15 RX ORDER — INDOMETHACIN 75 MG/1
CAPSULE, EXTENDED RELEASE ORAL
Qty: 28 CAPSULE | Refills: 5 | Status: SHIPPED | OUTPATIENT
Start: 2021-05-15

## 2021-05-15 NOTE — TELEPHONE ENCOUNTER
Last refill: 09/21/20  Qty: 28  w 5 refills  Last ov: 07/28/20    Requested Prescriptions     Pending Prescriptions Disp Refills   • INDOMETHACIN ER 75 MG Oral Cap CR [Pharmacy Med Name: INDOMETHACIN 75MG ER CAPSULES] 28 capsule 5     Sig: TAKE 1 CAPSULE B

## 2021-05-24 ENCOUNTER — PATIENT MESSAGE (OUTPATIENT)
Dept: FAMILY MEDICINE CLINIC | Facility: CLINIC | Age: 56
End: 2021-05-24

## 2021-05-24 NOTE — TELEPHONE ENCOUNTER
From: Nakita Hart  To: Martín Benson MD  Sent: 5/24/2021 11:34 AM CDT  Subject: Non-Urgent Medical Question    FYI - I have received my Covid vaccination. I am attaching an image.     Thanks  Eliud Burnham

## 2021-08-04 RX ORDER — LISINOPRIL 10 MG/1
TABLET ORAL
Qty: 90 TABLET | Refills: 0 | Status: SHIPPED | OUTPATIENT
Start: 2021-08-04 | End: 2021-10-28

## 2021-08-04 NOTE — TELEPHONE ENCOUNTER
Last office visit: 7/28/20  Last refill: 4/21/21  Last CMP: 11/7/18  No future appointments. [employer] MESSAGE SENT TO PATIENT TO SCHEDULE OFFICE VISIT.     Name from pharmacy: LISINOPRIL 10MG TABLETS         Will file in chart as: LISINOPRIL 10 MG Oral Tab

## 2021-10-28 RX ORDER — LISINOPRIL 10 MG/1
10 TABLET ORAL DAILY
Qty: 30 TABLET | Refills: 0 | Status: SHIPPED | OUTPATIENT
Start: 2021-10-28 | End: 2021-12-02

## 2021-10-28 RX ORDER — LISINOPRIL 10 MG/1
TABLET ORAL
Qty: 90 TABLET | Refills: 0 | OUTPATIENT
Start: 2021-10-28

## 2021-10-28 NOTE — TELEPHONE ENCOUNTER
Requested Prescriptions     Pending Prescriptions Disp Refills   • lisinopril 10 MG Oral Tab 90 tablet 0     Sig: Take 1 tablet (10 mg total) by mouth daily.    Last refill #90 on 8/4/2021  Last office visit pertaining to refill on 7/28/2020  Patient is daja

## 2021-10-28 NOTE — TELEPHONE ENCOUNTER
Last office visit: 7/28/20: has been seen since for other issues  Last refill: Today Qty: 30. Needs an appointment  Labs Due  No future appointments.   Name from pharmacy: LISINOPRIL 10MG TABLETS          Will file in chart as: LISINOPRIL 10 MG Oral Tab

## 2021-11-12 ENCOUNTER — OFFICE VISIT (OUTPATIENT)
Dept: FAMILY MEDICINE CLINIC | Facility: CLINIC | Age: 56
End: 2021-11-12
Payer: COMMERCIAL

## 2021-11-12 ENCOUNTER — LAB ENCOUNTER (OUTPATIENT)
Dept: LAB | Age: 56
End: 2021-11-12
Attending: FAMILY MEDICINE
Payer: COMMERCIAL

## 2021-11-12 VITALS
SYSTOLIC BLOOD PRESSURE: 130 MMHG | HEIGHT: 69 IN | TEMPERATURE: 99 F | BODY MASS INDEX: 36.29 KG/M2 | OXYGEN SATURATION: 100 % | DIASTOLIC BLOOD PRESSURE: 80 MMHG | HEART RATE: 82 BPM | WEIGHT: 245 LBS | RESPIRATION RATE: 18 BRPM

## 2021-11-12 DIAGNOSIS — Z00.00 WELL ADULT EXAM: ICD-10-CM

## 2021-11-12 DIAGNOSIS — Z99.89 OSA ON CPAP: ICD-10-CM

## 2021-11-12 DIAGNOSIS — Z11.1 ENCOUNTER FOR SCREENING FOR RESPIRATORY TUBERCULOSIS: Primary | ICD-10-CM

## 2021-11-12 DIAGNOSIS — E66.9 OBESITY (BMI 35.0-39.9 WITHOUT COMORBIDITY): ICD-10-CM

## 2021-11-12 DIAGNOSIS — M1A.0790 CHRONIC IDIOPATHIC GOUT INVOLVING TOE WITHOUT TOPHUS, UNSPECIFIED LATERALITY: ICD-10-CM

## 2021-11-12 DIAGNOSIS — G47.33 OSA ON CPAP: ICD-10-CM

## 2021-11-12 DIAGNOSIS — Z00.00 LABORATORY EXAMINATION ORDERED AS PART OF A ROUTINE GENERAL MEDICAL EXAMINATION: ICD-10-CM

## 2021-11-12 PROCEDURE — 81003 URINALYSIS AUTO W/O SCOPE: CPT | Performed by: FAMILY MEDICINE

## 2021-11-12 PROCEDURE — 3075F SYST BP GE 130 - 139MM HG: CPT | Performed by: FAMILY MEDICINE

## 2021-11-12 PROCEDURE — 3079F DIAST BP 80-89 MM HG: CPT | Performed by: FAMILY MEDICINE

## 2021-11-12 PROCEDURE — 86580 TB INTRADERMAL TEST: CPT | Performed by: FAMILY MEDICINE

## 2021-11-12 PROCEDURE — 99396 PREV VISIT EST AGE 40-64: CPT | Performed by: FAMILY MEDICINE

## 2021-11-12 PROCEDURE — 3008F BODY MASS INDEX DOCD: CPT | Performed by: FAMILY MEDICINE

## 2021-11-14 PROBLEM — M25.50 PAIN IN JOINT: Status: RESOLVED | Noted: 2020-11-11 | Resolved: 2021-11-14

## 2021-11-14 PROBLEM — Z78.9 ADMITS TO ALCOHOL CONSUMPTION: Status: RESOLVED | Noted: 2020-11-25 | Resolved: 2021-11-14

## 2021-11-14 NOTE — PROGRESS NOTES
Bert Mei is a 49year old male.     CC:  Patient presents with:  Physical      Subjective:    Chief Complaint Reviewed and Verified  No Further Nursing Notes to Review    Tobacco Reviewed  Allergies Reviewed  Medications Reviewed  Problem   Relda Fraction 254 lb 4 oz (115.3 kg)  03/28/19 : 254 lb (115.2 kg)  11/05/18 : 246 lb 8 oz (111.8 kg)  07/12/17 : 238 lb (108 kg)      BP Readings from Last 3 Encounters:  11/12/21 : 130/80  07/28/20 : 138/88  10/01/19 : (!) 176/108    REVIEW OF SYSTEMS:   GENERAL: feel exam  4. Obesity (BMI 35.0-39.9 without comorbidity)  Overview:  Estimated body mass index is 36.4 kg/m² as calculated from the following:    Height as of this encounter: 69\". Weight as of this encounter: 246 lb 8 oz.     5. Chronic idiopathic gout invo

## 2021-11-15 ENCOUNTER — NURSE ONLY (OUTPATIENT)
Dept: FAMILY MEDICINE CLINIC | Facility: CLINIC | Age: 56
End: 2021-11-15
Payer: COMMERCIAL

## 2021-12-02 RX ORDER — LISINOPRIL 10 MG/1
TABLET ORAL
Qty: 30 TABLET | Refills: 0 | Status: SHIPPED | OUTPATIENT
Start: 2021-12-02 | End: 2022-01-03

## 2021-12-02 RX ORDER — LISINOPRIL 10 MG/1
TABLET ORAL
Qty: 90 TABLET | Refills: 0 | OUTPATIENT
Start: 2021-12-02

## 2021-12-02 NOTE — TELEPHONE ENCOUNTER
Requested Prescriptions     Pending Prescriptions Disp Refills   • LISINOPRIL 10 MG Oral Tab [Pharmacy Med Name: LISINOPRIL 10MG TABLETS] 30 tablet 0     Sig: TAKE 1 TABLET(10 MG) BY MOUTH DAILY     Last refill #30 on 10/28/2021  Last office visit pertaini

## 2021-12-02 NOTE — TELEPHONE ENCOUNTER
Requested Prescriptions     Refused Prescriptions Disp Refills   • LISINOPRIL 10 MG Oral Tab [Pharmacy Med Name: LISINOPRIL 10MG TABLETS] 90 tablet 0     Sig: TAKE 1 TABLET(10 MG) BY MOUTH DAILY     Refused By: Riaz Castanon     Reason for Refusal: Reque

## 2022-01-03 RX ORDER — LISINOPRIL 10 MG/1
TABLET ORAL
Qty: 90 TABLET | Refills: 0 | OUTPATIENT
Start: 2022-01-03

## 2022-01-03 RX ORDER — LISINOPRIL 10 MG/1
TABLET ORAL
Qty: 30 TABLET | Refills: 0 | Status: SHIPPED | OUTPATIENT
Start: 2022-01-03

## 2022-01-03 NOTE — TELEPHONE ENCOUNTER
Last refill: 12/02/21  Qty: 30  W/ 0 refills  Last ov: 11/12/21    Requested Prescriptions     Pending Prescriptions Disp Refills   • LISINOPRIL 10 MG Oral Tab [Pharmacy Med Name: LISINOPRIL 10MG TABLETS] 30 tablet 0     Sig: TAKE 1 TABLET(10 MG) BY MOUTH

## 2022-01-03 NOTE — TELEPHONE ENCOUNTER
Last refill; 01/03/22  Qty: 30  W/ 0 refills  Last ov: 11/12/21     Requested Prescriptions     Pending Prescriptions Disp Refills   • LISINOPRIL 10 MG Oral Tab [Pharmacy Med Name: LISINOPRIL 10MG TABLETS] 90 tablet 0     Sig: TAKE 1 TABLET(10 MG) BY MOUTH

## 2022-01-14 ENCOUNTER — PATIENT MESSAGE (OUTPATIENT)
Dept: FAMILY MEDICINE CLINIC | Facility: CLINIC | Age: 57
End: 2022-01-14

## 2022-01-14 DIAGNOSIS — Z12.11 SCREENING FOR COLON CANCER: Primary | ICD-10-CM

## 2022-01-14 NOTE — TELEPHONE ENCOUNTER
From: Tabby Menendez  To: Crystal Ricci MD  Sent: 1/14/2022 8:44 AM CST  Subject: Blood Tests - Louie Killian    Just had a visit with Dr. Jovi Combs yesterday and according to him I am anemic. When I look at the results I am not seeing this.  Can you p

## 2022-02-08 RX ORDER — LISINOPRIL 10 MG/1
TABLET ORAL
Qty: 90 TABLET | Refills: 0 | Status: SHIPPED | OUTPATIENT
Start: 2022-02-08

## 2022-02-08 RX ORDER — LISINOPRIL 10 MG/1
TABLET ORAL
Qty: 30 TABLET | Refills: 0 | Status: SHIPPED | OUTPATIENT
Start: 2022-02-08 | End: 2022-02-08

## 2022-03-05 ENCOUNTER — LAB ENCOUNTER (OUTPATIENT)
Dept: LAB | Age: 57
End: 2022-03-05
Attending: INTERNAL MEDICINE
Payer: COMMERCIAL

## 2022-03-05 DIAGNOSIS — Z01.818 PRE-OP TESTING: ICD-10-CM

## 2022-03-06 LAB — SARS-COV-2 RNA RESP QL NAA+PROBE: NOT DETECTED

## 2022-03-08 PROBLEM — Z12.11 SPECIAL SCREENING FOR MALIGNANT NEOPLASM OF COLON: Status: ACTIVE | Noted: 2022-03-08

## 2022-05-03 RX ORDER — LISINOPRIL 10 MG/1
TABLET ORAL
Qty: 90 TABLET | Refills: 0 | Status: SHIPPED | OUTPATIENT
Start: 2022-05-03

## 2022-08-31 RX ORDER — LISINOPRIL 10 MG/1
TABLET ORAL
Qty: 30 TABLET | Refills: 0 | Status: SHIPPED | OUTPATIENT
Start: 2022-08-31

## 2022-08-31 NOTE — TELEPHONE ENCOUNTER
Hypertension Medications Protocol Failed 08/27/2022 06:26 AM   Protocol Details  CMP or BMP in past 12 months    Appointment in past 6 or next 3 months    Last serum creatinine< 2.0     Last refill - 5/3/22 - #90   Last CMP - 1/12/22 - creatinine - 1.30  Last office visit - 11/12/21  [facility] sent - due for office visit and labs

## 2022-09-03 RX ORDER — LISINOPRIL 10 MG/1
10 TABLET ORAL DAILY
Qty: 30 TABLET | Refills: 0 | OUTPATIENT
Start: 2022-09-03

## 2022-09-03 NOTE — TELEPHONE ENCOUNTER
Last office visit: 11/12/21  Last refill:  08/31/22  #30, no refills  Last cmp:  01/12/22  BP Readings from Last 3 Encounters:  11/12/21 : 130/80  07/28/20 : 138/88  10/01/19 : (!) 176/108    Future Appointments   Date Time Provider Leah Javier   9/27/2022  3:40 PM Charity Husain MD [facility] [place]

## 2022-09-27 ENCOUNTER — OFFICE VISIT (OUTPATIENT)
Dept: FAMILY MEDICINE CLINIC | Facility: CLINIC | Age: 57
End: 2022-09-27

## 2022-09-27 VITALS
DIASTOLIC BLOOD PRESSURE: 80 MMHG | BODY MASS INDEX: 35.7 KG/M2 | HEIGHT: 69 IN | RESPIRATION RATE: 12 BRPM | SYSTOLIC BLOOD PRESSURE: 138 MMHG | TEMPERATURE: 98 F | HEART RATE: 85 BPM | WEIGHT: 241 LBS | OXYGEN SATURATION: 98 %

## 2022-09-27 DIAGNOSIS — Z13.220 SCREENING, LIPID: ICD-10-CM

## 2022-09-27 DIAGNOSIS — G47.33 OSA ON CPAP: ICD-10-CM

## 2022-09-27 DIAGNOSIS — M1A.09X0 CHRONIC GOUT OF MULTIPLE SITES, UNSPECIFIED CAUSE: ICD-10-CM

## 2022-09-27 DIAGNOSIS — Z00.00 LABORATORY EXAMINATION ORDERED AS PART OF A ROUTINE GENERAL MEDICAL EXAMINATION: ICD-10-CM

## 2022-09-27 DIAGNOSIS — Z99.89 OSA ON CPAP: ICD-10-CM

## 2022-09-27 DIAGNOSIS — E79.0 HYPERURICEMIA: ICD-10-CM

## 2022-09-27 DIAGNOSIS — M1A.0790 CHRONIC IDIOPATHIC GOUT INVOLVING TOE WITHOUT TOPHUS, UNSPECIFIED LATERALITY: ICD-10-CM

## 2022-09-27 DIAGNOSIS — E66.9 OBESITY (BMI 35.0-39.9 WITHOUT COMORBIDITY): ICD-10-CM

## 2022-09-27 DIAGNOSIS — Z00.00 WELL ADULT EXAM: Primary | ICD-10-CM

## 2022-09-27 DIAGNOSIS — Z12.5 SCREENING PSA (PROSTATE SPECIFIC ANTIGEN): ICD-10-CM

## 2022-09-27 PROCEDURE — 3008F BODY MASS INDEX DOCD: CPT | Performed by: FAMILY MEDICINE

## 2022-09-27 PROCEDURE — 3075F SYST BP GE 130 - 139MM HG: CPT | Performed by: FAMILY MEDICINE

## 2022-09-27 PROCEDURE — 3079F DIAST BP 80-89 MM HG: CPT | Performed by: FAMILY MEDICINE

## 2022-09-27 PROCEDURE — 99396 PREV VISIT EST AGE 40-64: CPT | Performed by: FAMILY MEDICINE

## 2022-09-27 RX ORDER — FEBUXOSTAT 80 MG/1
1 TABLET, FILM COATED ORAL DAILY
COMMUNITY
Start: 2022-08-27

## 2022-09-27 RX ORDER — PREDNISONE 2.5 MG
2.5 TABLET ORAL DAILY
COMMUNITY
Start: 2022-08-27

## 2022-10-05 ENCOUNTER — TELEPHONE (OUTPATIENT)
Dept: FAMILY MEDICINE CLINIC | Facility: CLINIC | Age: 57
End: 2022-10-05

## 2022-10-05 RX ORDER — LISINOPRIL 10 MG/1
10 TABLET ORAL DAILY
Qty: 90 TABLET | Refills: 0 | Status: SHIPPED | OUTPATIENT
Start: 2022-10-05 | End: 2023-01-03

## 2022-11-22 ENCOUNTER — MED REC SCAN ONLY (OUTPATIENT)
Dept: FAMILY MEDICINE CLINIC | Facility: CLINIC | Age: 57
End: 2022-11-22

## 2022-12-28 ENCOUNTER — TELEPHONE (OUTPATIENT)
Dept: FAMILY MEDICINE CLINIC | Facility: CLINIC | Age: 57
End: 2022-12-28

## 2022-12-28 DIAGNOSIS — Z12.5 SCREENING PSA (PROSTATE SPECIFIC ANTIGEN): Primary | ICD-10-CM

## 2022-12-28 LAB — AMB EXT PSA SCREEN: 0.37 NG/ML

## 2022-12-28 NOTE — TELEPHONE ENCOUNTER
Left message for patient to call. Need to advise that PSA was normal - (0.37).   To recheck in 1-3 years v.michelle. Dr. Alisa Valadez

## 2023-01-19 RX ORDER — LISINOPRIL 10 MG/1
TABLET ORAL
Qty: 90 TABLET | Refills: 0 | Status: SHIPPED | OUTPATIENT
Start: 2023-01-19

## 2023-02-27 ENCOUNTER — TELEPHONE (OUTPATIENT)
Dept: FAMILY MEDICINE CLINIC | Facility: CLINIC | Age: 58
End: 2023-02-27

## 2023-02-27 DIAGNOSIS — U07.1 COVID-19: Primary | ICD-10-CM

## 2023-02-27 NOTE — TELEPHONE ENCOUNTER
Patient tested positive for COVID on a home test.  Symptoms started on Thursday. He feels like his symptoms are improving. Congestion only and slight cough. Recommended supportive care measures including ibuprofen/tyenol, saline washes, humidifier, otc cough/decongestants. Reviewed danger signs and when to seek emergent care. Patient is asking if a PCR test should be done because his home tests were . Advised this is not necessary and he is symptomatic and home test was positive. Agree with advice. Any further recommendations?

## 2023-02-27 NOTE — TELEPHONE ENCOUNTER
The patient is a candidate for Paxlovid and I sent the prescription over to [facility] if he wishes that. He needs to stop colchicine while he is taking the Paxlovid.     Other advice is appropriate no further advice at this time

## 2023-02-28 ENCOUNTER — PATIENT MESSAGE (OUTPATIENT)
Dept: FAMILY MEDICINE CLINIC | Facility: CLINIC | Age: 58
End: 2023-02-28

## 2023-02-28 NOTE — TELEPHONE ENCOUNTER
He is not at hi risk for hospital  Though it does shorten the course and severity of the illness  If that is the only symptom he has  I would agree he should stop it.     Risk benefit not in his favor enough

## 2023-02-28 NOTE — TELEPHONE ENCOUNTER
From: Liliya Louie  To: Patito Lin MD  Sent: 2/28/2023 2:00 PM CST  Subject: Trevor Hernandez    I picked up the prescription today. It appears that this is used for people who are of a high risk of being hospitalized? The only symptom I currently have is nasal congestion. I took the first dose and immediately have a nasty taste in my mouth. Is it really worth the risk of the side effects? ?     Thanks  Hodan Perez

## 2023-04-07 ENCOUNTER — TELEPHONE (OUTPATIENT)
Dept: FAMILY MEDICINE CLINIC | Facility: CLINIC | Age: 58
End: 2023-04-07

## 2023-04-07 RX ORDER — LISINOPRIL 10 MG/1
10 TABLET ORAL DAILY
Qty: 90 TABLET | Refills: 1 | Status: SHIPPED
Start: 2023-04-07 | End: 2023-04-10

## 2023-04-10 RX ORDER — LISINOPRIL 10 MG/1
10 TABLET ORAL DAILY
Qty: 90 TABLET | Refills: 1 | Status: SHIPPED | OUTPATIENT
Start: 2023-04-10

## 2023-10-30 ENCOUNTER — OFFICE VISIT (OUTPATIENT)
Dept: FAMILY MEDICINE CLINIC | Facility: CLINIC | Age: 58
End: 2023-10-30

## 2023-10-30 VITALS
DIASTOLIC BLOOD PRESSURE: 88 MMHG | SYSTOLIC BLOOD PRESSURE: 138 MMHG | TEMPERATURE: 98 F | OXYGEN SATURATION: 99 % | HEART RATE: 80 BPM | BODY MASS INDEX: 36 KG/M2 | WEIGHT: 243 LBS

## 2023-10-30 DIAGNOSIS — J01.00 SUBACUTE MAXILLARY SINUSITIS: Primary | ICD-10-CM

## 2023-10-30 PROCEDURE — 3079F DIAST BP 80-89 MM HG: CPT

## 2023-10-30 PROCEDURE — 3075F SYST BP GE 130 - 139MM HG: CPT

## 2023-10-30 PROCEDURE — 99213 OFFICE O/P EST LOW 20 MIN: CPT

## 2023-10-30 RX ORDER — PREDNISONE 20 MG/1
TABLET ORAL
Qty: 9 TABLET | Refills: 0 | Status: SHIPPED | OUTPATIENT
Start: 2023-10-30 | End: 2023-11-05

## 2023-10-30 RX ORDER — COLCHICINE 0.6 MG/1
1 CAPSULE ORAL DAILY
COMMUNITY
Start: 2023-07-17

## 2023-10-30 RX ORDER — AMOXICILLIN AND CLAVULANATE POTASSIUM 875; 125 MG/1; MG/1
1 TABLET, FILM COATED ORAL 2 TIMES DAILY
Qty: 20 TABLET | Refills: 0 | Status: SHIPPED | OUTPATIENT
Start: 2023-10-30 | End: 2023-11-09

## 2023-10-30 RX ORDER — PREDNISONE 5 MG/1
5 TABLET ORAL DAILY
COMMUNITY
Start: 2023-06-01

## 2023-11-14 ENCOUNTER — PATIENT MESSAGE (OUTPATIENT)
Dept: FAMILY MEDICINE CLINIC | Facility: CLINIC | Age: 58
End: 2023-11-14

## 2023-11-14 NOTE — TELEPHONE ENCOUNTER
From: Miguel Rachel  To: Tacos Grimm  Sent: 11/14/2023 11:30 AM CST  Subject: Provider ID# - Insurance    Can I please get Dr. Esteban Shahdi ID # for my insurance application?     Thanks  Ivette Allen

## 2023-11-14 NOTE — TELEPHONE ENCOUNTER
Patient is needing provider ID # for insurance application    Left message for patient to call back     Tax ID # [id number]

## 2024-02-10 RX ORDER — LISINOPRIL 10 MG/1
10 TABLET ORAL DAILY
Qty: 30 TABLET | Refills: 0 | Status: SHIPPED | OUTPATIENT
Start: 2024-02-10

## 2024-02-10 NOTE — TELEPHONE ENCOUNTER
OV 09/2022  LABS 04/2023 EXTERNAL COMP    REFILL 04/10/23 #90 +1 RF     No future appointments. MCM SENT - NEEDS TO SCHEDULE OV

## 2024-02-13 ENCOUNTER — PATIENT MESSAGE (OUTPATIENT)
Dept: FAMILY MEDICINE CLINIC | Facility: CLINIC | Age: 59
End: 2024-02-13

## 2024-02-13 NOTE — TELEPHONE ENCOUNTER
From: Justin Carrasco  To: Shun Grimm  Sent: 2/13/2024 12:57 PM CST  Subject: Prescription    [facility] is telling me that they are waiting for you to approve my refill??

## 2024-03-25 ENCOUNTER — PATIENT MESSAGE (OUTPATIENT)
Dept: FAMILY MEDICINE CLINIC | Facility: CLINIC | Age: 59
End: 2024-03-25

## 2024-03-25 RX ORDER — LISINOPRIL 10 MG/1
10 TABLET ORAL DAILY
Qty: 30 TABLET | Refills: 0 | Status: SHIPPED | OUTPATIENT
Start: 2024-03-25

## 2024-03-25 NOTE — TELEPHONE ENCOUNTER
Request for lisinopril     LOV  10-30-23 APRN  BP) 138/88             9-27-22 physical                   LAST LAB  4-18-23 Chem Profile  Creatinine 0.97  gfr >90    LAST RX  2-15-24  #30    Next OV  No future appointments.      PROTOCOL  Hypertension Medications Protocol [id number]/25/2024 07:58 AM   Protocol Details CMP or BMP in past 12 months    EGFRCR or GFRNAA > 50    Last BP reading less than 140/90    In person appointment or virtual visit in the past 12 mos or appointment in next 3 mos       My chart message sent regarding need for physical appointment and blood work.    Future Appointments   Date Time Provider Department Center   4/23/2024  3:20 PM Shun Grimm MD [facility] [place]

## 2024-03-25 NOTE — TELEPHONE ENCOUNTER
From: Justin Carrasco  To: Shun Grimm  Sent: 3/25/2024 7:47 AM CDT  Subject: Lisinopril    Need a refill - but can you please change the store to [facility].    Thanks  Jaleel

## 2024-04-23 ENCOUNTER — OFFICE VISIT (OUTPATIENT)
Dept: FAMILY MEDICINE CLINIC | Facility: CLINIC | Age: 59
End: 2024-04-23
Payer: COMMERCIAL

## 2024-04-23 VITALS
HEART RATE: 80 BPM | DIASTOLIC BLOOD PRESSURE: 70 MMHG | BODY MASS INDEX: 36.37 KG/M2 | HEIGHT: 68 IN | WEIGHT: 240 LBS | SYSTOLIC BLOOD PRESSURE: 138 MMHG | OXYGEN SATURATION: 95 % | TEMPERATURE: 99 F | RESPIRATION RATE: 12 BRPM

## 2024-04-23 DIAGNOSIS — D17.79 LIPOMA OF OTHER SPECIFIED SITES: Primary | ICD-10-CM

## 2024-04-23 DIAGNOSIS — G47.33 OSA ON CPAP: ICD-10-CM

## 2024-04-23 DIAGNOSIS — M1A.0790 CHRONIC IDIOPATHIC GOUT INVOLVING TOE WITHOUT TOPHUS, UNSPECIFIED LATERALITY: ICD-10-CM

## 2024-04-23 DIAGNOSIS — E79.0 HYPERURICEMIA: ICD-10-CM

## 2024-04-23 DIAGNOSIS — Z00.00 WELL ADULT EXAM: ICD-10-CM

## 2024-04-23 DIAGNOSIS — M1A.09X0 CHRONIC GOUT OF MULTIPLE SITES, UNSPECIFIED CAUSE: ICD-10-CM

## 2024-04-23 DIAGNOSIS — E66.9 OBESITY (BMI 35.0-39.9 WITHOUT COMORBIDITY): ICD-10-CM

## 2024-04-23 PROCEDURE — 3078F DIAST BP <80 MM HG: CPT | Performed by: FAMILY MEDICINE

## 2024-04-23 PROCEDURE — 96127 BRIEF EMOTIONAL/BEHAV ASSMT: CPT | Performed by: FAMILY MEDICINE

## 2024-04-23 PROCEDURE — 3008F BODY MASS INDEX DOCD: CPT | Performed by: FAMILY MEDICINE

## 2024-04-23 PROCEDURE — 3075F SYST BP GE 130 - 139MM HG: CPT | Performed by: FAMILY MEDICINE

## 2024-04-23 PROCEDURE — 99396 PREV VISIT EST AGE 40-64: CPT | Performed by: FAMILY MEDICINE

## 2024-04-29 NOTE — PROGRESS NOTES
Justin Carrasco is a 59 year old male.    CC:    Chief Complaint   Patient presents with    Physical       Subjective:     Chief Complaint Reviewed and Verified  No Further Nursing Notes to   Review  Tobacco Reviewed  Allergies Reviewed  Medications Reviewed    Problem List Reviewed  Medical History Reviewed  Surgical History   Reviewed  Family History Reviewed  Social History Reviewed         Here for complete physical examination  Gout well controlled  No known injury    Has a lump in the lower left buttock area  Not hard not painful  Noted 6-8 weeks  unclear how long    Wishes removal    History/Other:   Allergies:  No Known Allergies   Current Meds:  has a current medication list which includes the following prescription(s): lisinopril, febuxostat, and colchicine.      History:  Past Medical History:    Arthritis    Gout    Sleep apnea      Past Surgical History:   Procedure Laterality Date    Vasectomy        Family History   Problem Relation Age of Onset    Heart Disorder Father     Hypertension Father     Ulcerative Colitis Brother     Colon Cancer Maternal Grandmother       Family Status   Relation Status    Fa     Mo     Bro Alive    MGMA (Not Specified)      Social History     Socioeconomic History    Marital status:    Tobacco Use    Smoking status: Never    Smokeless tobacco: Current     Types: Snuff   Vaping Use    Vaping status: Never Used   Substance and Sexual Activity    Alcohol use: Yes     Alcohol/week: 24.0 standard drinks of alcohol     Types: 24 Cans of beer per week     Comment: 6pack daily    Drug use: Never          Immunization History   Administered Date(s) Administered    Covid-19 Vaccine Moderna 100 mcg/0.5 ml 2021, 2021, 2021, 2022    FLULAVAL 6 months & older 0.5 ml Prefilled syringe (89255) 10/01/2019    FLUZONE 6 months and older PFS 0.5 ml ([id number]) 10/01/2019    TDAP 2017    Tb Intradermal Test 2021          Wt Readings from Last 6 Encounters:   04/23/24 240 lb (108.9 kg)   10/30/23 243 lb (110.2 kg)   09/27/22 241 lb (109.3 kg)   03/08/22 240 lb (108.9 kg)   11/12/21 245 lb (111.1 kg)   07/28/20 253 lb (114.8 kg)       BP Readings from Last 3 Encounters:   04/23/24 138/70   10/30/23 138/88   09/27/22 138/80     REVIEW OF SYSTEMS:   GENERAL: feels well otherwise  SKIN: see HPI   EYES:denies blurred vision or double vision  HEENT: denies nasal congestion, sinus pain or ST  LUNGS: denies shortness of breath with exertion  CARDIOVASCULAR: denies chest pain on exertion  GI: denies abdominal pain,denies heartburn   : denies nocturia or changes in stream  MUSCULOSKELETAL: denies back pain  NEURO: denies headaches  PSYCHE: denies depression or anxiety    Objective:    EXAM:   Blood pressure 138/70, pulse 80, temperature 99.3 °F (37.4 °C), temperature source Temporal, resp. rate 12, height 5' 8\" (1.727 m), weight 240 lb (108.9 kg), SpO2 95%.  Body mass index is 36.49 kg/m².      Reviewed by Dr Grimm    GENERAL: well developed, well nourished,in no apparent distress  SKIN: lioma low left buttock adjacent rectum in posterior perineal area    HEENT: atraumatic, normocephalic,ears and throat are clear  EYES:PERRLA, EOMI, normal optic disk,conjunctiva are clear  NECK: supple,no adenopathy,no bruits  CHEST: no chest tenderness  LUNGS: clear to auscultation  CARDIO: RRR without murmur  GI: good BS's,no masses, HSM or tenderness  : Testes desc bilaterally no hernia noted   see skin above  RECTAL: defer         MUSCULOSKELETAL: back is not tender,FROM of the back  EXTREMITIES: no cyanosis, clubbing or edema  NEURO: Oriented times three,cranial nerves are intact,motor and sensory are grossly intact    Assessment & Plan:       ASSESSMENT:    1. Lipoma of other specified sites (Primary)  -     SURGERY - INTERNAL  2. Well adult exam  3. Chronic gout of multiple sites, unspecified cause  Overview:  Since 2010  4. Obesity (BMI  35.0-39.9 without comorbidity)  Overview:  Estimated body mass index is 36.4 kg/m² as calculated from the following:    Height as of this encounter: 69\".    Weight as of this encounter: 246 lb 8 oz.    5. SANDRO on CPAP  Overview:  Managed dr diane  6. Hyperuricemia  7. Chronic idiopathic gout involving toe without tophus, unspecified laterality            Meds & Refills for this Visit:  Requested Prescriptions      No prescriptions requested or ordered in this encounter

## 2024-05-02 RX ORDER — LISINOPRIL 10 MG/1
10 TABLET ORAL DAILY
Qty: 90 TABLET | Refills: 1 | Status: SHIPPED | OUTPATIENT
Start: 2024-05-02

## 2024-05-02 NOTE — TELEPHONE ENCOUNTER
Last refill: 03/25/24  qtY: 30  W/ 0 refills  Last ov: 04/23/24    Requested Prescriptions     Pending Prescriptions Disp Refills    lisinopril 10 MG Oral Tab 30 tablet 0     Sig: Take 1 tablet (10 mg total) by mouth daily. NO FURTHER REFILLS UNTIL SEES PCP     No future appointments.

## 2024-12-02 RX ORDER — LISINOPRIL 10 MG/1
10 TABLET ORAL DAILY
Qty: 90 TABLET | Refills: 0 | Status: SHIPPED | OUTPATIENT
Start: 2024-12-02

## 2024-12-02 NOTE — TELEPHONE ENCOUNTER
OV 04/23/24  LABS 04/22/24 COMP Dr. Mccall    REFILL 05/02/24 #90 +1 RF    No future appointments.    BP Readings from Last 3 Encounters:   04/23/24 138/70   10/30/23 138/88   09/27/22 138/80

## 2024-12-03 ENCOUNTER — PATIENT MESSAGE (OUTPATIENT)
Dept: FAMILY MEDICINE CLINIC | Facility: CLINIC | Age: 59
End: 2024-12-03

## 2024-12-03 DIAGNOSIS — M1A.09X0 CHRONIC GOUT OF MULTIPLE SITES, UNSPECIFIED CAUSE: Primary | ICD-10-CM

## 2024-12-03 RX ORDER — PREDNISONE 5 MG/1
5 TABLET ORAL DAILY
Qty: 10 TABLET | Refills: 3 | Status: SHIPPED | OUTPATIENT
Start: 2024-12-03

## 2025-03-11 RX ORDER — LISINOPRIL 10 MG/1
10 TABLET ORAL DAILY
Qty: 30 TABLET | Refills: 0 | Status: SHIPPED | OUTPATIENT
Start: 2025-03-11

## 2025-03-11 NOTE — TELEPHONE ENCOUNTER
lisinopril 10 MG Oral Tab     Hypertension Medications Protocol Ksinrs2003/11/2025 01:46 PM   Protocol Details CMP or BMP in past 12 months    EGFRCR or GFRNAA > 50    Last BP reading less than 140/90    In person appointment or virtual visit in the past 12 mos or appointment in next 3 mos    Medication is active on med list      Last office visit:  4/23/24  No future appointments.  Last filled:   12/2/24  #90  Last labs:  Oneil LOWERY eGFR-CR: 86  Last BP:   138/70    Due for a medication follow up visit.

## 2025-03-11 NOTE — TELEPHONE ENCOUNTER
LOV:4-  LAST LAB:4/22/024  LAST RX:  LISINOPRIL 10 MG Oral Tab 90 tablet 0 12/2/2024 --   Sig:   TAKE 1 TABLET(10 MG) BY MOUTH DAILY     Next OV: No future appointments.   PROTOCOL

## 2025-07-14 ENCOUNTER — TELEPHONE (OUTPATIENT)
Dept: FAMILY MEDICINE CLINIC | Facility: CLINIC | Age: 60
End: 2025-07-14

## 2025-07-14 DIAGNOSIS — M1A.09X0 CHRONIC GOUT OF MULTIPLE SITES, UNSPECIFIED CAUSE: Primary | ICD-10-CM

## 2025-07-14 DIAGNOSIS — Z00.00 LABORATORY EXAMINATION ORDERED AS PART OF A ROUTINE GENERAL MEDICAL EXAMINATION: ICD-10-CM

## 2025-07-14 DIAGNOSIS — Z13.29 SCREENING FOR THYROID DISORDER: ICD-10-CM

## 2025-07-14 DIAGNOSIS — Z12.5 SCREENING FOR PROSTATE CANCER: ICD-10-CM

## 2025-07-14 DIAGNOSIS — Z13.1 SCREENING FOR DIABETES MELLITUS: ICD-10-CM

## 2025-07-14 DIAGNOSIS — Z13.6 SCREENING FOR CARDIOVASCULAR CONDITION: ICD-10-CM

## 2025-07-14 DIAGNOSIS — E79.0 HYPERURICEMIA: ICD-10-CM

## 2025-07-14 DIAGNOSIS — Z13.0 SCREENING FOR IRON DEFICIENCY ANEMIA: ICD-10-CM

## 2025-07-14 DIAGNOSIS — Z11.59 ENCOUNTER FOR HEPATITIS C SCREENING TEST FOR LOW RISK PATIENT: ICD-10-CM

## 2025-07-14 NOTE — TELEPHONE ENCOUNTER
Future Appointments   Date Time Provider Department Center   7/22/2025  7:00 AM REF [facility]   7/28/2025  3:20 PM Shun Grimm MD [facility] [place]

## 2025-07-14 NOTE — TELEPHONE ENCOUNTER
OV 04/23/24  LABS 04/22/24 external comp    REFILL 03/11/25 #30 - dispensed 03/18/25.     No future appointments. Calling patient to schedule physical and labs - left vm to call back    BP Readings from Last 3 Encounters:   04/23/24 138/70   10/30/23 138/88   09/27/22 138/80

## 2025-07-16 RX ORDER — LISINOPRIL 10 MG/1
10 TABLET ORAL DAILY
Qty: 30 TABLET | Refills: 0 | Status: SHIPPED | OUTPATIENT
Start: 2025-07-16

## 2025-07-16 RX ORDER — LISINOPRIL 10 MG/1
10 TABLET ORAL DAILY
Qty: 90 TABLET | Refills: 0 | OUTPATIENT
Start: 2025-07-16

## 2025-07-16 NOTE — TELEPHONE ENCOUNTER
Patient scheduled appointment.   Future Appointments   Date Time Provider Department Center   7/22/2025  7:00 AM REF [facility]   7/28/2025  3:20 PM Shun Grimm MD [facility] [place]

## 2025-07-22 ENCOUNTER — LABORATORY ENCOUNTER (OUTPATIENT)
Dept: LAB | Age: 60
End: 2025-07-22
Attending: FAMILY MEDICINE
Payer: COMMERCIAL

## 2025-07-22 DIAGNOSIS — Z13.0 SCREENING FOR IRON DEFICIENCY ANEMIA: ICD-10-CM

## 2025-07-22 DIAGNOSIS — Z11.59 ENCOUNTER FOR HEPATITIS C SCREENING TEST FOR LOW RISK PATIENT: ICD-10-CM

## 2025-07-22 DIAGNOSIS — Z12.5 SCREENING FOR PROSTATE CANCER: ICD-10-CM

## 2025-07-22 DIAGNOSIS — Z13.6 SCREENING FOR CARDIOVASCULAR CONDITION: ICD-10-CM

## 2025-07-22 DIAGNOSIS — Z00.00 LABORATORY EXAMINATION ORDERED AS PART OF A ROUTINE GENERAL MEDICAL EXAMINATION: ICD-10-CM

## 2025-07-22 DIAGNOSIS — Z13.1 SCREENING FOR DIABETES MELLITUS: ICD-10-CM

## 2025-07-22 DIAGNOSIS — M1A.09X0 CHRONIC GOUT OF MULTIPLE SITES, UNSPECIFIED CAUSE: ICD-10-CM

## 2025-07-22 DIAGNOSIS — E79.0 HYPERURICEMIA: ICD-10-CM

## 2025-07-22 DIAGNOSIS — Z13.29 SCREENING FOR THYROID DISORDER: ICD-10-CM

## 2025-07-22 LAB
ALBUMIN SERPL-MCNC: 4.5 G/DL (ref 3.2–4.8)
ALBUMIN/GLOB SERPL: 1.7 {RATIO} (ref 1–2)
ALP LIVER SERPL-CCNC: 70 U/L (ref 45–117)
ALT SERPL-CCNC: 27 U/L (ref 10–49)
ANION GAP SERPL CALC-SCNC: 9 MMOL/L (ref 0–18)
AST SERPL-CCNC: 26 U/L (ref ?–34)
BASOPHILS # BLD AUTO: 0.03 X10(3) UL (ref 0–0.2)
BASOPHILS NFR BLD AUTO: 0.7 %
BILIRUB SERPL-MCNC: 0.5 MG/DL (ref 0.3–1.2)
BUN BLD-MCNC: 14 MG/DL (ref 9–23)
CALCIUM BLD-MCNC: 9.1 MG/DL (ref 8.7–10.6)
CHLORIDE SERPL-SCNC: 101 MMOL/L (ref 98–112)
CHOLEST SERPL-MCNC: 185 MG/DL (ref ?–200)
CO2 SERPL-SCNC: 29 MMOL/L (ref 21–32)
COMPLEXED PSA SERPL-MCNC: 0.27 NG/ML (ref ?–4)
CREAT BLD-MCNC: 1.15 MG/DL (ref 0.7–1.3)
EGFRCR SERPLBLD CKD-EPI 2021: 74 ML/MIN/1.73M2 (ref 60–?)
EOSINOPHIL # BLD AUTO: 0.13 X10(3) UL (ref 0–0.7)
EOSINOPHIL NFR BLD AUTO: 3 %
ERYTHROCYTE [DISTWIDTH] IN BLOOD BY AUTOMATED COUNT: 13.2 %
EST. AVERAGE GLUCOSE BLD GHB EST-MCNC: 114 MG/DL (ref 68–126)
FASTING PATIENT LIPID ANSWER: YES
FASTING STATUS PATIENT QL REPORTED: YES
GLOBULIN PLAS-MCNC: 2.6 G/DL (ref 2–3.5)
GLUCOSE BLD-MCNC: 91 MG/DL (ref 70–99)
HBA1C MFR BLD: 5.6 % (ref ?–5.7)
HCT VFR BLD AUTO: 40.7 % (ref 39–53)
HCV AB SERPL QL IA: NONREACTIVE
HDLC SERPL-MCNC: 51 MG/DL (ref 40–59)
HGB BLD-MCNC: 13.5 G/DL (ref 13–17.5)
IMM GRANULOCYTES # BLD AUTO: 0.02 X10(3) UL (ref 0–1)
IMM GRANULOCYTES NFR BLD: 0.5 %
LDLC SERPL CALC-MCNC: 81 MG/DL (ref ?–100)
LYMPHOCYTES # BLD AUTO: 1.37 X10(3) UL (ref 1–4)
LYMPHOCYTES NFR BLD AUTO: 32.1 %
MCH RBC QN AUTO: 32.1 PG (ref 26–34)
MCHC RBC AUTO-ENTMCNC: 33.2 G/DL (ref 31–37)
MCV RBC AUTO: 96.9 FL (ref 80–100)
MONOCYTES # BLD AUTO: 0.46 X10(3) UL (ref 0.1–1)
MONOCYTES NFR BLD AUTO: 10.8 %
NEUTROPHILS # BLD AUTO: 2.26 X10 (3) UL (ref 1.5–7.7)
NEUTROPHILS # BLD AUTO: 2.26 X10(3) UL (ref 1.5–7.7)
NEUTROPHILS NFR BLD AUTO: 52.9 %
NONHDLC SERPL-MCNC: 134 MG/DL (ref ?–130)
OSMOLALITY SERPL CALC.SUM OF ELEC: 288 MOSM/KG (ref 275–295)
PLATELET # BLD AUTO: 180 10(3)UL (ref 150–450)
POTASSIUM SERPL-SCNC: 4.1 MMOL/L (ref 3.5–5.1)
PROT SERPL-MCNC: 7.1 G/DL (ref 5.7–8.2)
RBC # BLD AUTO: 4.2 X10(6)UL (ref 4.3–5.7)
SODIUM SERPL-SCNC: 139 MMOL/L (ref 136–145)
TRIGL SERPL-MCNC: 327 MG/DL (ref 30–149)
TSI SER-ACNC: 3.36 UIU/ML (ref 0.55–4.78)
URATE SERPL-MCNC: 7.9 MG/DL (ref 3.7–9.2)
VLDLC SERPL CALC-MCNC: 52 MG/DL (ref 0–30)
WBC # BLD AUTO: 4.3 X10(3) UL (ref 4–11)

## 2025-07-22 PROCEDURE — 80061 LIPID PANEL: CPT | Performed by: FAMILY MEDICINE

## 2025-07-22 PROCEDURE — 84550 ASSAY OF BLOOD/URIC ACID: CPT | Performed by: FAMILY MEDICINE

## 2025-07-22 PROCEDURE — 86803 HEPATITIS C AB TEST: CPT | Performed by: FAMILY MEDICINE

## 2025-07-22 PROCEDURE — 80050 GENERAL HEALTH PANEL: CPT | Performed by: FAMILY MEDICINE

## 2025-07-22 PROCEDURE — 84153 ASSAY OF PSA TOTAL: CPT | Performed by: FAMILY MEDICINE

## 2025-07-22 PROCEDURE — 83036 HEMOGLOBIN GLYCOSYLATED A1C: CPT | Performed by: FAMILY MEDICINE

## 2025-07-28 ENCOUNTER — OFFICE VISIT (OUTPATIENT)
Dept: FAMILY MEDICINE CLINIC | Facility: CLINIC | Age: 60
End: 2025-07-28
Payer: COMMERCIAL

## 2025-07-28 VITALS
SYSTOLIC BLOOD PRESSURE: 160 MMHG | HEIGHT: 68.56 IN | WEIGHT: 246 LBS | BODY MASS INDEX: 36.85 KG/M2 | HEART RATE: 82 BPM | RESPIRATION RATE: 18 BRPM | OXYGEN SATURATION: 98 % | DIASTOLIC BLOOD PRESSURE: 98 MMHG | TEMPERATURE: 98 F

## 2025-07-28 DIAGNOSIS — E79.0 HYPERURICEMIA: ICD-10-CM

## 2025-07-28 DIAGNOSIS — G47.33 OSA ON CPAP: ICD-10-CM

## 2025-07-28 DIAGNOSIS — E66.9 OBESITY (BMI 35.0-39.9 WITHOUT COMORBIDITY): ICD-10-CM

## 2025-07-28 DIAGNOSIS — E78.1 HYPERTRIGLYCERIDEMIA: ICD-10-CM

## 2025-07-28 DIAGNOSIS — Z00.00 WELL ADULT EXAM: Primary | ICD-10-CM

## 2025-07-28 NOTE — PROGRESS NOTES
The following individual(s) verbally consented to be recorded using ambient AI listening technology and understand that they can each withdraw their consent to this listening technology at any point by asking the clinician to turn off or pause the recording:    Patient name: Justin Carrasco

## 2025-07-28 NOTE — PROGRESS NOTES
Justin Carrasco is a 60 year old male.    CC:    Chief Complaint   Patient presents with    Physical     Reviewed Preventative/Wellness form with patient.         History of Present Illness  Justin Carrasco is a 60 year old male with hypertriglyceridemia and gout who presents for a follow-up visit.    His recent lab work showed a triglyceride level of 327 mg/dL. He attributes this to dietary choices and alcohol consumption, especially after a recent trip where he consumed more alcohol and unhealthy food than usual. His recent lab work showed a total cholesterol level that he considered to be fine.    He has a history of gout, managed with febuxostat 80 mg every other day, which effectively controls his uric acid levels, previously reaching as low as 4 mg/dL. He is concerned about potential side effects of febuxostat, including a possible impact on sexual arousal, and has reduced his dose to alleviate these concerns.    He experiences joint issues, particularly in his fingers, and uses prednisone 5 mg once a week for symptom relief. He previously took higher doses but reduced it due to side effects.    He typically measures his blood pressure at home as 135/80 mmHg. He attributes some of this to being overweight and wants to lose 15-20 pounds. 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    He reports occasional nocturia, needing to urinate once at night. He uses smokeless tobacco, having two small chews a day, and occasionally smokes cigars, with one in the last six months.     Subjective:     Chief Complaint Reviewed and Verified  Nursing Notes Reviewed and   Verified  Tobacco Reviewed  Allergies Reviewed  Medications Reviewed    Problem List Reviewed  Medical History Reviewed  Surgical History   Reviewed  Family History Reviewed  Social History Reviewed           History/Other:   Allergies:  Allergies[1]   Current Meds:  has a current  medication list which includes the following prescription(s): lisinopril, prednisone, and febuxostat.      History:  Past Medical History[2]   Past Surgical History[3]   Family History[4]   Family Status   Relation Status    Fa     Mo     Bro Alive    MGMA (Not Specified)      Short Social Hx on File[5]       Immunization History   Administered Date(s) Administered    Covid-19 Vaccine Moderna 100 mcg/0.5 ml 2021, 2021, 2021, 2022    FLULAVAL 6 months & older 0.5 ml Prefilled syringe (77151) 10/01/2019    FLUZONE 6 months and older PFS 0.5 ml (46751) 10/01/2019    TDAP 2017    Tb Intradermal Test 2021       Results  LABS  Triglycerides: 327 mg/dL  Uric Acid: within normal limits  Hepatitis C: negative  Hemoglobin A1c: within normal limits  Glucose: 91 mg/dL  Complete Blood Count: within normal limits    Wt Readings from Last 6 Encounters:   25 246 lb (111.6 kg)   24 240 lb (108.9 kg)   10/30/23 243 lb (110.2 kg)   22 241 lb (109.3 kg)   22 240 lb (108.9 kg)   21 245 lb (111.1 kg)       BP Readings from Last 3 Encounters:   25 (!) 160/98   24 138/70   10/30/23 138/88     REVIEW OF SYSTEMS:   GENERAL: feels well otherwise  SKIN: denies any unusual skin lesions  EYES:denies blurred vision or double vision  HEENT: denies nasal congestion, sinus pain or ST  LUNGS: denies shortness of breath with exertion  CARDIOVASCULAR: denies chest pain on exertion  GI: denies abdominal pain,denies heartburn   : denies nocturia or changes in stream  no nocturia  No urinary leakage   MUSCULOSKELETAL: denies back pain  NEURO: denies headaches  PSYCHE: denies depression or anxiety    Objective:    EXAM:   Blood pressure (!) 160/98, pulse 82, temperature 97.8 °F (36.6 °C), temperature source Temporal, resp. rate 18, height 5' 8.56\" (1.741 m), weight 246 lb (111.6 kg), SpO2 98%.  Body mass index is 36.79 kg/m².      Reviewed by   Sirisha  Physical Exam  GENERAL: Alert, cooperative, well developed, no acute distress.  HEENT: Normocephalic, normal oropharynx, moist mucous membranes.  CHEST: Clear to auscultation bilaterally, no wheezes, rhonchi, or crackles.  CARDIOVASCULAR: Normal heart rate and rhythm, S1 and S2 normal without murmurs.  ABDOMEN: Soft, non-tender, non-distended, without organomegaly, normal bowel sounds.  EXTREMITIES: No cyanosis or edema.  NEUROLOGICAL: Cranial nerves grossly intact, moves all extremities without gross motor or sensory deficit.       : defer  RECTAL: defer         Assessment & Plan:       ASSESSMENT:    Assessment & Plan  Hypertriglyceridemia  Triglycerides elevated at 327 mg/dL. Normal thyroid function. Dietary factors likely contributing.  - Advise dietary modifications.  - Recommend reducing alcohol consumption.    Hypertension  Blood pressure elevated during visit. Typically 135/80 mmHg at rest. Weight loss could aid control.  - Recheck blood pressure during visit.    Chronic gout  Well-controlled with febuxostat every other day. No evidence linking febuxostat to decreased sexual arousal. Prefers no specialist visits.  - Continue febuxostat at current dose.  - Manage gout without specialist referral.    Obesity  Weight stable. Acknowledges need to lose 15-20 pounds. Weight loss could aid blood pressure control.  - Encourage weight loss through diet and exercise.    General Health Maintenance  Negative hepatitis C screening. Normal A1c and glucose. Up to date on tetanus vaccination.  - No further hepatitis C screening unless symptomatic.  - Update tetanus vaccination in 2027.       1. Well adult exam (Primary)  2. Hyperuricemia  Overview:  >>OVERVIEW FOR GOUT WRITTEN ON 9/21/2016 12:41 PM BY MARCO CABRERA MD    Since 2010    3. Obesity (BMI 35.0-39.9 without comorbidity)  Overview:  Estimated body mass index is 36.4 kg/m² as calculated from the following:    Height as of this encounter: 69\".    Weight  as of this encounter: 246 lb 8 oz.    4. Hypertriglyceridemia  Comments:  w nl lipid ldl  not hi enouggh for meds  d/t diet indiscretion  5. SANDRO on CPAP  Overview:  Managed dr diane      Meds & Refills for this Visit:  Requested Prescriptions      No prescriptions requested or ordered in this encounter                      [1] No Known Allergies  [2]   Past Medical History:   Arthritis    Gout    Sleep apnea   [3]   Past Surgical History:  Procedure Laterality Date    Vasectomy     [4]   Family History  Problem Relation Age of Onset    Heart Disorder Father     Hypertension Father     Ulcerative Colitis Brother     Colon Cancer Maternal Grandmother    [5]   Social History  Socioeconomic History    Marital status:    Tobacco Use    Smoking status: Never    Smokeless tobacco: Current     Types: Snuff   Vaping Use    Vaping status: Never Used   Substance and Sexual Activity    Alcohol use: Yes     Alcohol/week: 24.0 standard drinks of alcohol     Types: 24 Cans of beer per week     Comment: 6pack daily    Drug use: Never

## 2025-07-29 ENCOUNTER — PATIENT MESSAGE (OUTPATIENT)
Dept: FAMILY MEDICINE CLINIC | Facility: CLINIC | Age: 60
End: 2025-07-29